# Patient Record
Sex: FEMALE | Race: WHITE
[De-identification: names, ages, dates, MRNs, and addresses within clinical notes are randomized per-mention and may not be internally consistent; named-entity substitution may affect disease eponyms.]

---

## 2021-05-18 ENCOUNTER — HOSPITAL ENCOUNTER (OUTPATIENT)
Dept: HOSPITAL 75 - RAD | Age: 41
End: 2021-05-18
Attending: INTERNAL MEDICINE
Payer: MEDICAID

## 2021-05-18 DIAGNOSIS — Z12.31: Primary | ICD-10-CM

## 2021-05-18 PROCEDURE — 77067 SCR MAMMO BI INCL CAD: CPT

## 2021-05-18 PROCEDURE — 77063 BREAST TOMOSYNTHESIS BI: CPT

## 2021-05-18 NOTE — DIAGNOSTIC IMAGING REPORT
INDICATION: Routine screening.



COMPARISON: No prior mammograms are available for comparison.

This is a baseline study.



2-D and 3-D bilateral screening mammography was performed with

CAD.



Scattered fibroglandular densities are identified bilaterally. No

mass or malignant appearing microcalcifications are seen. Axillae

are unremarkable.



IMPRESSION: BI-RADS Category 1. 



No mammographic features suspicious for malignancy are

identified.



Dictated by: 



  Dictated on workstation # [id number]

## 2021-07-04 ENCOUNTER — HOSPITAL ENCOUNTER (EMERGENCY)
Dept: HOSPITAL 75 - ER | Age: 41
LOS: 1 days | Discharge: HOME | End: 2021-07-05
Payer: MEDICAID

## 2021-07-04 VITALS — WEIGHT: 214.07 LBS | HEIGHT: 61.97 IN | BODY MASS INDEX: 39.39 KG/M2

## 2021-07-04 DIAGNOSIS — N39.0: Primary | ICD-10-CM

## 2021-07-04 LAB
ALBUMIN SERPL-MCNC: 4.2 GM/DL (ref 3.2–4.5)
ALP SERPL-CCNC: 71 U/L (ref 40–136)
ALT SERPL-CCNC: 20 U/L (ref 0–55)
APTT PPP: YELLOW S
BASOPHILS # BLD AUTO: 0 10^3/UL (ref 0–0.1)
BASOPHILS NFR BLD AUTO: 0 % (ref 0–10)
BILIRUB SERPL-MCNC: 0.3 MG/DL (ref 0.1–1)
BILIRUB UR QL STRIP: NEGATIVE
BUN/CREAT SERPL: 12
CALCIUM SERPL-MCNC: 8.8 MG/DL (ref 8.5–10.1)
CHLORIDE SERPL-SCNC: 103 MMOL/L (ref 98–107)
CO2 SERPL-SCNC: 23 MMOL/L (ref 21–32)
CREAT SERPL-MCNC: 0.85 MG/DL (ref 0.6–1.3)
EOSINOPHIL # BLD AUTO: 0 10^3/UL (ref 0–0.3)
EOSINOPHIL NFR BLD AUTO: 1 % (ref 0–10)
FIBRINOGEN PPP-MCNC: CLEAR MG/DL
GFR SERPLBLD BASED ON 1.73 SQ M-ARVRAT: > 60 ML/MIN
GLUCOSE SERPL-MCNC: 188 MG/DL (ref 70–105)
GLUCOSE UR STRIP-MCNC: (no result) MG/DL
HCT VFR BLD CALC: 43 % (ref 35–52)
HGB BLD-MCNC: 14.2 G/DL (ref 11.5–16)
KETONES UR QL STRIP: NEGATIVE
LEUKOCYTE ESTERASE UR QL STRIP: NEGATIVE
LIPASE SERPL-CCNC: 18 U/L (ref 8–78)
LYMPHOCYTES # BLD AUTO: 1.3 10^3/UL (ref 1–4)
LYMPHOCYTES NFR BLD AUTO: 37 % (ref 12–44)
MANUAL DIFFERENTIAL PERFORMED BLD QL: NO
MCH RBC QN AUTO: 30 PG (ref 25–34)
MCHC RBC AUTO-ENTMCNC: 33 G/DL (ref 32–36)
MCV RBC AUTO: 90 FL (ref 80–99)
MONOCYTES # BLD AUTO: 0.5 10^3/UL (ref 0–1)
MONOCYTES NFR BLD AUTO: 14 % (ref 0–12)
NEUTROPHILS # BLD AUTO: 1.6 10^3/UL (ref 1.8–7.8)
NEUTROPHILS NFR BLD AUTO: 48 % (ref 42–75)
NITRITE UR QL STRIP: POSITIVE
PH UR STRIP: 5.5 [PH] (ref 5–9)
PLATELET # BLD: 189 10^3/UL (ref 130–400)
PMV BLD AUTO: 9.6 FL (ref 9–12.2)
POTASSIUM SERPL-SCNC: 3.4 MMOL/L (ref 3.6–5)
PROT SERPL-MCNC: 7.6 GM/DL (ref 6.4–8.2)
PROT UR QL STRIP: NEGATIVE
SODIUM SERPL-SCNC: 138 MMOL/L (ref 135–145)
SP GR UR STRIP: >=1.03 (ref 1.02–1.02)
WBC # BLD AUTO: 3.4 10^3/UL (ref 4.3–11)

## 2021-07-04 PROCEDURE — 83690 ASSAY OF LIPASE: CPT

## 2021-07-04 PROCEDURE — 81000 URINALYSIS NONAUTO W/SCOPE: CPT

## 2021-07-04 PROCEDURE — 87186 SC STD MICRODIL/AGAR DIL: CPT

## 2021-07-04 PROCEDURE — 36415 COLL VENOUS BLD VENIPUNCTURE: CPT

## 2021-07-04 PROCEDURE — 87077 CULTURE AEROBIC IDENTIFY: CPT

## 2021-07-04 PROCEDURE — 87088 URINE BACTERIA CULTURE: CPT

## 2021-07-04 PROCEDURE — 85025 COMPLETE CBC W/AUTO DIFF WBC: CPT

## 2021-07-04 PROCEDURE — 80306 DRUG TEST PRSMV INSTRMNT: CPT

## 2021-07-04 PROCEDURE — 86141 C-REACTIVE PROTEIN HS: CPT

## 2021-07-04 PROCEDURE — 80053 COMPREHEN METABOLIC PANEL: CPT

## 2021-07-04 NOTE — ED ABDOMINAL PAIN
General


Chief Complaint:  Abdominal/GI Problems


Stated Complaint:  LEFT SIDE ABD PAIN


Source of Information:  Patient


Exam Limitations:  No Limitations





History of Present Illness


Date Seen by Provider:  Jul 4, 2021


Time Seen by Provider:  23:08


Initial Comments


Patient presents ER by private conveyance with chief complaint that for the past

4 days she has had some progressively worsening left lower abdominal pain 

radiating through to her back flank and left shoulder blade.  Not worse with 

eating.  Better with laying on her left side.  No recent trauma.  No rash fever 

chills nausea vomiting.  She did have some diarrhea although she associated that

with her Metformin and taking laxatives.  She has a history of gastroparesis 

which she associates with her history of methamphetamine use.  She used to take 

Reglan for it but has not had any available to her for the past year.





Allergies and Home Medications


Allergies


Coded Allergies:  


     Penicillins (Verified  Allergy, Unknown, rash, 7/4/21)


     Sulfa (Sulfonamide Antibiotics) (Verified  Allergy, Unknown, stops 

breathing, 7/4/21)


     nitrofurantoin (Verified  Allergy, Unknown, 7/4/21)





Home Medications


Cefdinir 300 Mg Capsule, 300 MG PO BID


   Prescribed by: ANNE-MARIE CHAVIRA on 7/5/21 0112





Patient Home Medication List


Home Medication List Reviewed:  Yes





Review of Systems


Review of Systems


Constitutional:  No chills, No diaphoresis


EENTM:  No Blurred Vision, No Double Vision


Respiratory:  Denies Cough, Denies Orthopnea


Cardiovascular:  Denies Chest Pain, Denies Edema


Gastrointestinal:  Abdominal Pain; Denies Constipated, Denies Diarrhea


Genitourinary:  Denies Burning, Denies Drainage


Musculoskeletal:  No back pain, No gout


Skin:  No change in color, No dryness


Psychiatric/Neurological:  Denies Depressed, Denies Headache





All Other Systems Reviewed


Negative Unless Noted:  Yes





Past Medical-Social-Family Hx


Patient Social History


Tobacco Use?:  No


Use of E-Cig and/or Vaping dev:  No


Substance use?:  Yes


Substance type:  Methamphetamine





Physical Exam


Vital Signs





Vital Signs - First Documented








 7/4/21 7/5/21





 23:00 01:37


 


Temp 37.0 


 


Pulse 91 


 


Resp 18 


 


B/P (MAP) 148/99 (115) 


 


Pulse Ox 97 


 


O2 Delivery  Room Air





Capillary Refill :


Height/Weight/BMI


Height: '"


Weight: lbs. oz. kg;  BMI


Method:


General Appearance:  WD/WN, mild distress


HEENT:  PERRL/EOMI, pharynx normal


Neck:  full range of motion, normal inspection


Respiratory:  lungs clear, normal breath sounds, no respiratory distress, no 

accessory muscle use


Cardiovascular:  normal peripheral pulses, regular rate, rhythm


Gastrointestinal:  non tender, soft


Extremities:  normal range of motion, normal capillary refill


Neurologic/Psychiatric:  alert, normal mood/affect, oriented x 3


Skin:  normal color, warm/dry





Progress/Results/Core Measures


Results/Orders


Lab Results





Laboratory Tests








Test


 7/4/21


23:10 7/4/21


23:45 Range/Units


 


 


White Blood Count


 3.4 L


 


 4.3-11.0


10^3/uL


 


Red Blood Count


 4.77 


 


 3.80-5.11


10^6/uL


 


Hemoglobin 14.2   11.5-16.0  g/dL


 


Hematocrit 43   35-52  %


 


Mean Corpuscular Volume 90   80-99  fL


 


Mean Corpuscular Hemoglobin 30   25-34  pg


 


Mean Corpuscular Hemoglobin


Concent 33 


 


 32-36  g/dL





 


Red Cell Distribution Width 13.0   10.0-14.5  %


 


Platelet Count


 189 


 


 130-400


10^3/uL


 


Mean Platelet Volume 9.6   9.0-12.2  fL


 


Immature Granulocyte % (Auto) 0    %


 


Neutrophils (%) (Auto) 48   42-75  %


 


Lymphocytes (%) (Auto) 37   12-44  %


 


Monocytes (%) (Auto) 14 H  0-12  %


 


Eosinophils (%) (Auto) 1   0-10  %


 


Basophils (%) (Auto) 0   0-10  %


 


Neutrophils # (Auto)


 1.6 L


 


 1.8-7.8


10^3/uL


 


Lymphocytes # (Auto)


 1.3 


 


 1.0-4.0


10^3/uL


 


Monocytes # (Auto)


 0.5 


 


 0.0-1.0


10^3/uL


 


Eosinophils # (Auto)


 0.0 


 


 0.0-0.3


10^3/uL


 


Basophils # (Auto)


 0.0 


 


 0.0-0.1


10^3/uL


 


Immature Granulocyte # (Auto)


 0.0 


 


 0.0-0.1


10^3/uL


 


Sodium Level 138   135-145  MMOL/L


 


Potassium Level 3.4 L  3.6-5.0  MMOL/L


 


Chloride Level 103     MMOL/L


 


Carbon Dioxide Level 23   21-32  MMOL/L


 


Anion Gap 12   5-14  MMOL/L


 


Blood Urea Nitrogen 10   7-18  MG/DL


 


Creatinine


 0.85 


 


 0.60-1.30


MG/DL


 


Estimat Glomerular Filtration


Rate > 60 


 


  





 


BUN/Creatinine Ratio 12    


 


Glucose Level 188 H    MG/DL


 


Calcium Level 8.8   8.5-10.1  MG/DL


 


Corrected Calcium 8.6   8.5-10.1  MG/DL


 


Total Bilirubin 0.3   0.1-1.0  MG/DL


 


Aspartate Amino Transf


(AST/SGOT) 21 


 


 5-34  U/L





 


Alanine Aminotransferase


(ALT/SGPT) 20 


 


 0-55  U/L





 


Alkaline Phosphatase 71     U/L


 


C-Reactive Protein High


Sensitivity 0.41 


 


 0.00-0.50


MG/DL


 


Total Protein 7.6   6.4-8.2  GM/DL


 


Albumin 4.2   3.2-4.5  GM/DL


 


Lipase 18   8-78  U/L


 


Urine Color  YELLOW   


 


Urine Clarity  CLEAR   


 


Urine pH  5.5  5-9  


 


Urine Specific Gravity  >=1.030  1.016-1.022  


 


Urine Protein  NEGATIVE  NEGATIVE  


 


Urine Glucose (UA)  TRACE H NEGATIVE  


 


Urine Ketones  NEGATIVE  NEGATIVE  


 


Urine Nitrite  POSITIVE H NEGATIVE  


 


Urine Bilirubin  NEGATIVE  NEGATIVE  


 


Urine Urobilinogen  0.2  < = 1.0  MG/DL


 


Urine Leukocyte Esterase  NEGATIVE  NEGATIVE  


 


Urine RBC (Auto)  NEGATIVE  NEGATIVE  


 


Urine RBC  0-2   /HPF


 


Urine WBC  2-5   /HPF


 


Urine Squamous Epithelial


Cells 


 2-5 


  /HPF





 


Urine Crystals  NONE   /LPF


 


Urine Bacteria  LARGE H  /HPF


 


Urine Casts  NONE   /LPF


 


Urine Mucus  SMALL H  /LPF


 


Urine Culture Indicated  YES   


 


Urine Opiates Screen  NEGATIVE  NEGATIVE  


 


Urine Oxycodone Screen  NEGATIVE  NEGATIVE  


 


Urine Methadone Screen  NEGATIVE  NEGATIVE  


 


Urine Propoxyphene Screen  NEGATIVE  NEGATIVE  


 


Urine Barbiturates Screen  NEGATIVE  NEGATIVE  


 


Ur Tricyclic Antidepressants


Screen 


 NEGATIVE 


 NEGATIVE  





 


Urine Phencyclidine Screen  NEGATIVE  NEGATIVE  


 


Urine Amphetamines Screen  NEGATIVE  NEGATIVE  


 


Urine Methamphetamines Screen  POSITIVE H NEGATIVE  


 


Urine Benzodiazepines Screen  NEGATIVE  NEGATIVE  


 


Urine Cocaine Screen  NEGATIVE  NEGATIVE  


 


Urine Cannabinoids Screen  NEGATIVE  NEGATIVE  








My Orders





Orders - ANNE-MARIE CHAVIRA


Cbc With Automated Diff (7/4/21 23:20)


Comprehensive Metabolic Panel (7/4/21 23:20)


Hs C Reactive Protein (7/4/21 23:20)


Lipase (7/4/21 23:20)


Ua Culture If Indicated (7/4/21 23:20)


Drug Screen Stat (Urine) (7/4/21 23:20)


Ed Iv/Invasive Line Start (7/4/21 23:20)


Diphenhydramine Injection (Benadryl Inje (7/4/21 23:30)


Metoclopramide Injection (Reglan Injecti (7/4/21 23:30)


Urine Culture (7/4/21 23:45)


Ceftriaxone (Rocephin) (7/5/21 01:00)





Medications Given in ED





Current Medications








 Medications  Dose


 Ordered  Sig/Darell


 Route  Start Time


 Stop Time Status Last Admin


Dose Admin


 


 Ceftriaxone


 Sodium 1000 mg/


 Sterile Water  10 ml @ 


 200 mls/hr  ONCE  ONCE


 IV  7/5/21 01:00


 7/5/21 01:02 DC 7/5/21 01:28


200 MLS/HR


 


 Diphenhydramine


 HCl  25 mg  ONCE  ONCE


 IVP  7/4/21 23:30


 7/4/21 23:31 DC 7/4/21 23:59


25 MG


 


 Metoclopramide HCl  10 mg  ONCE  ONCE


 IVP  7/4/21 23:30


 7/4/21 23:31 DC 7/4/21 23:59


10 MG








Vital Signs/I&O











 7/4/21 7/5/21





 23:00 01:37


 


Temp 37.0 


 


Pulse 91 81


 


Resp 18 18


 


B/P (MAP) 148/99 (115) 118/87 (115)


 


Pulse Ox 97 100


 


O2 Delivery  Room Air











Progress


Progress Note :  


   Time:  01:12


Progress Note


The patient does feel better after the Reglan but she does not want us to send a

prescription for any.  She was sleeping when we reassessed her.  Plan to treat 

her outpatient on third generation cephalosporins for her UTI.  Rocephin IV x1.





Departure


Impression





   Primary Impression:  


   Urinary tract infection


   Qualified Codes:  N30.00 - Acute cystitis without hematuria


Disposition:  01 HOME, SELF-CARE


Condition:  Stable





Departure-Patient Inst.


Decision time for Depature:  01:09


Patient Instructions:  Kidney Infection (DC)





Add. Discharge Instructions:  


Drink lots of fluids.


Cefdinir 1 capsule twice a day for the next 10 days.


Return to the ER if you are having worsening symptoms otherwise follow-up with 

your primary care doctor in a week.


Expect improvement over the next 3 to 4 days.


All discharge instructions reviewed with patient and/or family. Voiced 

understanding.


Scripts


Cefdinir (Cefdinir) 300 Mg Capsule


300 MG PO BID for 10 Days, #20 CAP 0 Refills


   Prov: FAN,ANNE-MARIE J         7/5/21


Work/School Note:  Work Release Form   Date Seen in the Emergency Department:  [facility]

ul 5, 2021


   Return to Work:  Jul 7, 2021


   Restrictions:  No Restrictions











ANNE-MARIE CHAVIRA                  Jul 4, 2021 23:27

## 2021-07-05 VITALS — DIASTOLIC BLOOD PRESSURE: 87 MMHG | SYSTOLIC BLOOD PRESSURE: 118 MMHG

## 2021-07-05 LAB
BACTERIA #/AREA URNS HPF: (no result) /HPF
BARBITURATES UR QL: NEGATIVE
BENZODIAZ UR QL SCN: NEGATIVE
COCAINE UR QL: NEGATIVE
METHADONE UR QL SCN: NEGATIVE
METHAMPHETAMINE SCREEN URINE S: POSITIVE
OPIATES UR QL SCN: NEGATIVE
OXYCODONE UR QL: NEGATIVE
PROPOXYPH UR QL: NEGATIVE
RBC #/AREA URNS HPF: (no result) /HPF
SQUAMOUS #/AREA URNS HPF: (no result) /HPF
TRICYCLICS UR QL SCN: NEGATIVE
WBC #/AREA URNS HPF: (no result) /HPF

## 2021-07-10 ENCOUNTER — HOSPITAL ENCOUNTER (EMERGENCY)
Dept: HOSPITAL 75 - ER FS | Age: 41
Discharge: HOME | End: 2021-07-10
Payer: MEDICAID

## 2021-07-10 VITALS — SYSTOLIC BLOOD PRESSURE: 110 MMHG | DIASTOLIC BLOOD PRESSURE: 74 MMHG

## 2021-07-10 VITALS — HEIGHT: 62.01 IN | WEIGHT: 209.88 LBS | BODY MASS INDEX: 38.13 KG/M2

## 2021-07-10 DIAGNOSIS — R11.10: Primary | ICD-10-CM

## 2021-07-10 LAB
ALBUMIN SERPL-MCNC: 3.6 GM/DL (ref 3.2–4.5)
ALP SERPL-CCNC: 62 U/L (ref 40–136)
ALT SERPL-CCNC: 14 U/L (ref 0–55)
APTT PPP: YELLOW S
BACTERIA #/AREA URNS HPF: (no result) /HPF
BASOPHILS # BLD AUTO: 0 10^3/UL (ref 0–0.1)
BASOPHILS NFR BLD AUTO: 0 % (ref 0–10)
BILIRUB SERPL-MCNC: 0.2 MG/DL (ref 0.1–1)
BILIRUB UR QL STRIP: NEGATIVE
BUN/CREAT SERPL: 12
CALCIUM SERPL-MCNC: 8.5 MG/DL (ref 8.5–10.1)
CHLORIDE SERPL-SCNC: 97 MMOL/L (ref 98–107)
CO2 SERPL-SCNC: 23 MMOL/L (ref 21–32)
CREAT SERPL-MCNC: 0.74 MG/DL (ref 0.6–1.3)
EOSINOPHIL # BLD AUTO: 0 10^3/UL (ref 0–0.3)
EOSINOPHIL NFR BLD AUTO: 0 % (ref 0–10)
FIBRINOGEN PPP-MCNC: CLEAR MG/DL
GFR SERPLBLD BASED ON 1.73 SQ M-ARVRAT: > 60 ML/MIN
GLUCOSE SERPL-MCNC: 182 MG/DL (ref 70–105)
GLUCOSE UR STRIP-MCNC: NEGATIVE MG/DL
HCT VFR BLD CALC: 43 % (ref 35–52)
HGB BLD-MCNC: 14.3 G/DL (ref 11.5–16)
KETONES UR QL STRIP: NEGATIVE
LEUKOCYTE ESTERASE UR QL STRIP: (no result)
LIPASE SERPL-CCNC: 37 U/L (ref 8–78)
LYMPHOCYTES # BLD AUTO: 1.9 X 10^3 (ref 1–4)
LYMPHOCYTES NFR BLD AUTO: 43 % (ref 12–44)
MANUAL DIFFERENTIAL PERFORMED BLD QL: NO
MCH RBC QN AUTO: 29 PG (ref 25–34)
MCHC RBC AUTO-ENTMCNC: 34 G/DL (ref 32–36)
MCV RBC AUTO: 87 FL (ref 80–99)
MONOCYTES # BLD AUTO: 0.3 X 10^3 (ref 0–1)
MONOCYTES NFR BLD AUTO: 6 % (ref 0–12)
NEUTROPHILS # BLD AUTO: 2.2 X 10^3 (ref 1.8–7.8)
NEUTROPHILS NFR BLD AUTO: 51 % (ref 42–75)
NITRITE UR QL STRIP: NEGATIVE
PH UR STRIP: 6 [PH] (ref 5–9)
PLATELET # BLD: 145 10^3/UL (ref 130–400)
PMV BLD AUTO: 10.4 FL (ref 7.4–10.4)
POTASSIUM SERPL-SCNC: 3.8 MMOL/L (ref 3.6–5)
PROT SERPL-MCNC: 7 GM/DL (ref 6.4–8.2)
PROT UR QL STRIP: NEGATIVE
RBC #/AREA URNS HPF: (no result) /HPF
SODIUM SERPL-SCNC: 133 MMOL/L (ref 135–145)
SP GR UR STRIP: 1.01 (ref 1.02–1.02)
WBC # BLD AUTO: 4.4 10^3/UL (ref 4.3–11)

## 2021-07-10 PROCEDURE — 85025 COMPLETE CBC W/AUTO DIFF WBC: CPT

## 2021-07-10 PROCEDURE — 80053 COMPREHEN METABOLIC PANEL: CPT

## 2021-07-10 PROCEDURE — 83690 ASSAY OF LIPASE: CPT

## 2021-07-10 PROCEDURE — 87088 URINE BACTERIA CULTURE: CPT

## 2021-07-10 PROCEDURE — 74019 RADEX ABDOMEN 2 VIEWS: CPT

## 2021-07-10 PROCEDURE — 81000 URINALYSIS NONAUTO W/SCOPE: CPT

## 2021-07-10 PROCEDURE — 36415 COLL VENOUS BLD VENIPUNCTURE: CPT

## 2021-07-10 NOTE — ED GI
General


Chief Complaint:  Abdominal/GI Problems


Stated Complaint:  VOMITING





History of Present Illness


Date Seen by Provider:  Jul 10, 2021


Time Seen by Provider:  16:27


Initial Comments


41-year-old female presents with vomiting.  Patient reports she is having hard 

time keeping them down.  She denies any abdominal pain.  Patient reports that 

she was recently treated about a week ago for a urinary tract infection with 

cefdinir.  Patient denies diarrhea.  Reports that the vomiting started 

yesterday.





Allergies and Home Medications


Allergies


Coded Allergies:  


     Penicillins (Verified  Allergy, Unknown, rash, 7/4/21)


     Sulfa (Sulfonamide Antibiotics) (Verified  Allergy, Unknown, stops 

breathing, 7/4/21)


     nitrofurantoin (Verified  Allergy, Unknown, 7/4/21)





Home Medications


Cefdinir 300 Mg Capsule, 300 MG PO BID


   Prescribed by: ANNE-MARIE CHAVIRA on 7/5/21 0112





Patient Home Medication List


Home Medication List Reviewed:  Yes





Review of Systems


Review of Systems


Constitutional:  No chills, No fever


Gastrointestinal:  Denies Abdominal Pain, Denies Diarrhea; Nausea, Vomiting


Genitourinary:  See HPI


Musculoskeletal:  no symptoms reported


Skin:  no symptoms reported


Psychiatric/Neurological:  No Symptoms Reported


Endocrine:  No Symptoms Reported


Hematologic/Lymphatic:  No Symptoms Reported





Physical Exam


Vital Signs





Vital Signs - First Documented








 7/10/21





 16:36


 


Temp 36.6


 


Pulse 76


 


Resp 20


 


B/P (MAP) 110/74 (86)


 


Pulse Ox 95


 


O2 Delivery Room Air





Capillary Refill :


Height/Weight/BMI


Height: '"


Weight: lbs. oz. kg; 39.00 BMI


Method:


General Appearance:  WD/WN, no apparent distress


Neck:  supple


Respiratory:  lungs clear, normal breath sounds


Cardiovascular:  normal peripheral pulses, regular rate, rhythm


Gastrointestinal:  non tender, soft


Extremities:  normal range of motion


Back:  no CVA tenderness


Neurologic/Psychiatric:  no motor/sensory deficits, alert, normal mood/affect, 

oriented x 3





Progress/Results/Core Measures


Results/Orders


Lab Results





Laboratory Tests








Test


 7/10/21


16:30 7/10/21


16:36 Range/Units


 


 


Urine Color YELLOW    


 


Urine Clarity CLEAR    


 


Urine pH 6.0   5-9  


 


Urine Specific Gravity 1.015 L  1.016-1.022  


 


Urine Protein NEGATIVE   NEGATIVE  


 


Urine Glucose (UA) NEGATIVE   NEGATIVE  


 


Urine Ketones NEGATIVE   NEGATIVE  


 


Urine Nitrite NEGATIVE   NEGATIVE  


 


Urine Bilirubin NEGATIVE   NEGATIVE  


 


Urine Urobilinogen 0.2   < = 1.0  MG/DL


 


Urine Leukocyte Esterase 1+ H  NEGATIVE  


 


Urine RBC (Auto) NEGATIVE   NEGATIVE  


 


Urine RBC 0-2    /HPF


 


Urine WBC 10-25 H   /HPF


 


Urine Squamous Epithelial


Cells 10-25 H


 


  /HPF





 


Urine Crystals NONE    /LPF


 


Urine Bacteria FEW H   /HPF


 


Urine Casts NONE    /LPF


 


Urine Mucus SMALL H   /LPF


 


Urine Culture Indicated YES    


 


White Blood Count


 


 4.4 


 4.3-11.0


10^3/uL


 


Red Blood Count


 


 4.87 


 4.35-5.85


10^6/uL


 


Hemoglobin  14.3  11.5-16.0  G/DL


 


Hematocrit  43  35-52  %


 


Mean Corpuscular Volume  87  80-99  FL


 


Mean Corpuscular Hemoglobin  29  25-34  PG


 


Mean Corpuscular Hemoglobin


Concent 


 34 


 32-36  G/DL





 


Red Cell Distribution Width  13.2  10.0-14.5  %


 


Platelet Count


 


 145 


 130-400


10^3/uL


 


Mean Platelet Volume  10.4  7.4-10.4  FL


 


Immature Granulocyte % (Auto)  0   %


 


Neutrophils (%) (Auto)  51  42-75  %


 


Lymphocytes (%) (Auto)  43  12-44  %


 


Monocytes (%) (Auto)  6  0-12  %


 


Eosinophils (%) (Auto)  0  0-10  %


 


Basophils (%) (Auto)  0  0-10  %


 


Neutrophils # (Auto)  2.2  1.8-7.8  X 10^3


 


Lymphocytes # (Auto)  1.9  1.0-4.0  X 10^3


 


Monocytes # (Auto)  0.3  0.0-1.0  X 10^3


 


Eosinophils # (Auto)


 


 0.0 


 0.0-0.3


10^3/uL


 


Basophils # (Auto)


 


 0.0 


 0.0-0.1


10^3/uL


 


Immature Granulocyte # (Auto)


 


 0.0 


 0.0-0.1


10^3/uL


 


Sodium Level  133 L 135-145  MMOL/L


 


Potassium Level  3.8  3.6-5.0  MMOL/L


 


Chloride Level  97 L   MMOL/L


 


Carbon Dioxide Level  23  21-32  MMOL/L


 


Anion Gap  13  5-14  MMOL/L


 


Blood Urea Nitrogen  9  7-18  MG/DL


 


Creatinine


 


 0.74 


 0.60-1.30


MG/DL


 


Estimat Glomerular Filtration


Rate 


 > 60 


  





 


BUN/Creatinine Ratio  12   


 


Glucose Level  182 H   MG/DL


 


Calcium Level  8.5  8.5-10.1  MG/DL


 


Corrected Calcium  8.8  8.5-10.1  MG/DL


 


Total Bilirubin  0.2  0.1-1.0  MG/DL


 


Aspartate Amino Transf


(AST/SGOT) 


 23 


 5-34  U/L





 


Alanine Aminotransferase


(ALT/SGPT) 


 14 


 0-55  U/L





 


Alkaline Phosphatase  62    U/L


 


Total Protein  7.0  6.4-8.2  GM/DL


 


Albumin  3.6  3.2-4.5  GM/DL


 


Lipase  37  8-78  U/L








My Orders





Orders - BAIG,BELKYS L DO


Cbc With Automated Diff (7/10/21 16:33)


Comprehensive Metabolic Panel (7/10/21 16:33)


Lipase (7/10/21 16:33)


Ua Culture If Indicated (7/10/21 16:33)


Abdomen Flat & Upright/Decub (7/10/21 16:33)


Ondansetron Injection (Zofran Injectio (7/10/21 16:45)


Lactated Ringers (Lr 1000 Ml Iv Solution (7/10/21 16:33)


Urine Culture (7/10/21 16:30)





Medications Given in ED





Current Medications








 Medications  Dose


 Ordered  Sig/Darell


 Route  Start Time


 Stop Time Status Last Admin


Dose Admin


 


 Ondansetron HCl  4 mg  ONCE  ONCE


 IVP  7/10/21 16:45


 7/10/21 16:46 DC 7/10/21 16:49


4 MG








Vital Signs/I&O











 7/10/21





 16:36


 


Temp 36.6


 


Pulse 76


 


Resp 20


 


B/P (MAP) 110/74 (86)


 


Pulse Ox 95


 


O2 Delivery Room Air











Progress


Progress Note :  


Progress Note


Patient with no acute findings in the ER.  Patient's urine is likely contaminant

as she has a significant amount of squamous cells.  Patient currently on 

cefdinir which show susceptibility based on review of her urine culture from a 

few days ago.  Patient with no signs of acute distress, vomiting while here in 

the ER.  I will prescribe her some Zofran for her vomiting.  She is stable and 

discharged home.





Departure


Impression





   Primary Impression:  


   Vomiting


   Qualified Codes:  R11.10 - Vomiting, unspecified


Disposition:  01 HOME, SELF-CARE


Condition:  Stable





Departure-Patient Inst.


Referrals:  


NO,LOCAL PHYSICIAN (PCP/Family)


Primary Care Physician


Patient Instructions:  Nausea and Vomiting, Adult





Add. Discharge Instructions:  


Clear liquid diet, advance as tolerated


Follow-up with your primary care provider in 3 to 4 days for recheck of today's 

symptoms





All discharge instructions reviewed with patient and/or family. Voiced 

understanding.


Scripts


Ondansetron (Ondansetron Odt) 4 Mg Tab.rapdis


4 MG PO Q6H PRN for NAUSEA/VOMITING, #20 TAB 0 Refills


   Prov: BELKYS BAIG DO         7/10/21











BELKYS BAIG DO               Jul 10, 2021 16:27

## 2021-07-10 NOTE — DIAGNOSTIC IMAGING REPORT
Clinical indication: Patient with nausea, vomiting and

generalized malaise x 1 week.



Exam: X-ray of the abdomen with multiple supine and upright

views.



Comparison: None.



Findings: There are multiple air-fluid levels seen along the

expected region of the colon. It is possible there is some

involvement of the small bowel. There is a loop of air-filled

intestine overlying the mid to lower abdomen which measures up to

4.1 cm in width. It is difficult to determine if this is small

bowel or colon. Otherwise, there are no dilated loops seen. There

is no gross evidence of bowel wall thickening. There is no

intra-abdominal free air.



There are no focal calcifications overlying the expected

regions/pathways of both kidneys, ureters and bladder regions.



The visualized bones and extra-abdominal soft tissues are

unremarkable. There appears to be mild bibasilar atelectasis.



Impression:

1: There is a loop of air distended intestine overlying the

midline lower abdomen. It is difficult to determine if this is

small bowel or colon. There are no other areas of dilated

intestine. There are multiple air-fluid levels seen overlying the

expected region of the colon. There may be areas involving the

small bowel as well. This may represent transient air distention

versus ileus versus intestinal obstruction. Gastroenteritis also

cannot be completely excluded. CT scan would better evaluate.



2: Suspected mild bibasilar atelectasis. If there are pulmonary

symptoms suggesting infection, then chest x-ray PA and lateral

views would better evaluate. 



Dictated by: 



  Dictated on workstation # [id number]

## 2021-10-09 ENCOUNTER — HOSPITAL ENCOUNTER (EMERGENCY)
Dept: HOSPITAL 75 - ER FS | Age: 41
Discharge: HOME | End: 2021-10-09
Payer: MEDICAID

## 2021-10-09 VITALS — HEIGHT: 61.81 IN | WEIGHT: 218.7 LBS | BODY MASS INDEX: 40.25 KG/M2

## 2021-10-09 VITALS — SYSTOLIC BLOOD PRESSURE: 130 MMHG | DIASTOLIC BLOOD PRESSURE: 95 MMHG

## 2021-10-09 DIAGNOSIS — S60.221A: Primary | ICD-10-CM
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
## 2021-10-09 PROCEDURE — 73130 X-RAY EXAM OF HAND: CPT

## 2021-10-09 PROCEDURE — 29125 APPL SHORT ARM SPLINT STATIC: CPT

## 2021-10-09 NOTE — XMS REPORT
Clinical Summary

                             Created on: 10/09/2021



Syeda Richardson

External Reference #: [id number]

: 1980

Sex: Female



Demographics





                          Address                   [address] [place] [address]  [address]

 

                          Home Phone                [phone]

 

                          Preferred Language        English

 

                          Marital Status            Single

 

                          Hoahaoism Affiliation     Unknown

 

                          Race                      White

 

                          Ethnic Group              Not  or 





Author





                          Author                    [facility]

 

                          Organization              [facility]

 

                          Address                   Unknown

 

                          Phone                     Unavailable







Support





                Name            Relationship    Address         Phone

 

                Nory Dasilva   [employer]            Unknown         [phone]







Care Team Providers





                    Care Team Member Name Role                Phone

 

                    Vasyl Gutierrez MD Unavailable         Unavailable







Source Comments

Some departments are not documenting in the electronic medical record.  If you d
o not see the information that you expected, contact Release of Information in [facility] Health Information Management department at [phone] for further assistan
ce in locating additional records.[facility]



Allergies





                                        Comments



                 Active Allergy  Reactions       Severity        Noted Date 

 

                                         



                     Nitrofurantoin      RASH                2014 



                                         Monohyd/M-Cryst    

 

                                         



                     Penicillins         RASH                2014 

 

                                        



Stop breathing



                     Sulfa (Sulfonamide  SEE COMMENTS        2014 



                                         Antibiotics)    







Medications





                          End Date                  Status



              Medication   Sig          Dispensed    Refills      Start  



                                         Date  

 

                                                    Active



                     oxyCODONE-acetaminophen  Take 1-2 Tabs       0   



                           (PERCOCET) 5-325 mg       by mouth     



                           tablet                    every 4 hours     



                                         as needed Max     



                                         12 tabs/day     

 

                                                    Active



                     metformin-ER(+)     Take 1,000 mg       0   



                           (FORTAMET) 1,000 mg       by mouth     



                           tablet                    daily with     



                                         dinner.     

 

                                                    Active



                     omeprazole (PRILOSEC) 10  Take 10 mg by       0   



                           mg capsule                mouth daily.     

 

                                                    Active



                     metoclopramide HCl  Take 10 mg by       0   



                           (REGLAN) 10 mg tablet     mouth before     



                                         meals and at     



                                         bedtime.     







Active Problems





 



                           Problem                   Noted Date

 

 



                           Orbit fracture, right     2014

 

 



                                         Overview:



                                         Formatting of this note might be differ

ent from the original.



                                         Floor







Social History





                                        Date



                 Tobacco Use     Types           Packs/Day       Years Used 

 

                                         



                                         Never Smoker    

 

    



                                         Smokeless Tobacco: Never   



                                         Used   







                                        Tobacco Cessation: Counseling Given: Yes









                                        Comments



                           Alcohol Use               Standard Drinks/Week 

 

                                         



                           Not Asked                 0 (1 standard drink = 0.6 o

z pure alcohol) 







 



                           Sex Assigned at Birth     Date Recorded

 

 



                                         Not on file 







Last Filed Vital Signs





                    Reading             Time Taken          Comments



                                         Vital Sign   

 

                    120/80              09/15/2014 11:51 AM CDT  



                                         Blood Pressure   

 

                    66                  09/15/2014 11:51 AM CDT  



                                         Pulse   

 

                    36.7 C (98 F)   09/15/2014 11:51 AM CDT  



                                         Temperature   

 

                    -                   -                    



                                         Respiratory Rate   

 

                    -                   -                    



                                         Oxygen Saturation   

 

                    -                   -                    



                                         Inhaled Oxygen   



                                         Concentration   

 

                    112.9 kg (249 lb)   09/15/2014 11:51 AM CDT  



                                         Weight   

 

                    157.5 cm (5' 2")    09/15/2014 11:51 AM CDT  



                                         Height   

 

                    45.54               09/15/2014 11:51 AM CDT  



                                         Body Mass Index   







Plan of Treatment





   



                 Health Maintenance  Due Date        Last Done       Comments

 

   



                           HIV SCREENING             1995  

 

   



                           DTAP/TDAP VACCINES (1 -   1998  



                                         Tdap)   

 

   



                           HEPATITIS C SCREENING     1998  

 

   



                           PHYSICAL (COMPREHENSIVE)  1998  



                                         EXAM   

 

   



                           CERVICAL CANCER SCREENING  2001  

 

   



                           BREAST CANCER SCREENING   2020  

 

   



                           INFLUENZA VACCINE         2021  







Results

Not on filefrom Last 3 Months

## 2021-10-09 NOTE — XMS REPORT
Clinical Summary

                             Created on: 10/09/2021



Syeda Richardson

External Reference #: [id number]

: 1980

Sex: Female



Demographics





                          Address                   [address]

[place]  [address]

 

                          Home Phone                [phone]

 

                          Preferred Language        English

 

                          Marital Status            Single

 

                          Sikh Affiliation     Unknown

 

                          Race                      White

 

                          Ethnic Group              Not  or 





Author





                          Author                    [facility]

 

                          Organization              [facility]

 

                          Address                   Unknown

 

                          Phone                     Unavailable







Support





                Name            Relationship    Address         Phone

 

                    Isai Fish          [employer]                [address] 

apt 5

Unknown                                 [phone]







Care Team Providers





                    Care Team Member Name Role                Phone

 

                    Muppalla, Samantha MD PCP                 [phone]

 

                    Kiara Pittman APRN Unavailable         [phone]







Allergies





                                        Comments



                 Active Allergy  Reactions       Severity        Noted Date 

 

                                         



                 Adhesive Tape   Rash            Low             2010 

 

                                         



                           Cephalexin                Nausea And   



                                         Vomiting   

 

                                         



                 Latex           Rash            Low             2018 

 

                                         



                     Metronidazole       Rash                Low  

 

                                         



                     Nitrofurantoin      Rash                Low  

 

                                         



                 Penicillins     Rash            Medium          2018 

 

                                         



                 Sulfa Antibiotics  Anaphylaxis     High            2018 







Medications





                          End Date                  Status



              Medication   Sig          Dispensed    Refills      Start  



                                         Date  

 

                                                    Active



              metoclopramide (REGLAN)  Take 1 tablet  40 tablet    0            

  



                     10 MG tabletIndications:  (10 mg total)       8  



                           Chronic constipation      by mouth 4     



                                         (four) times     



                                         daily.     

 

                                                    Active



                     metFORMIN (GLUCOPHAGE)  Take 500 mg         0   



                           500 MG tablet             by mouth 2     



                                         (two) times     



                                         daily with     



                                         meals.     

 

                                                    Active



                     acetaminophen (TYLENOL)  Take 500 mg         0   



                           500 MG tablet             by mouth     



                                         every 6 (six)     



                                         hours as     



                                         needed for     



                                         Mild Pain.     

 

                                                    Active



                     ibuprofen (ADVIL,MOTRIN)  Take 800 mg         0   



                           800 MG tablet             by mouth     



                                         every 6 (six)     



                                         hours as     



                                         needed for     



                                         Mild Pain.     

 

                                                    Active



              albuterol (PROAIR,  Inhale 2     8 g          1            /2

01  



                     PROVENTIL, VENTOLIN) 108  puffs into          8  



                           (90 Base) MCG/ACT         the lungs     



                           inhalerIndications:       every 6 (six)     



                           Asthma                    hours as     



                                         needed for     



                                         Wheezing.     



                                         Indications:     



                                         Asthma     

 

                                                    Active



              busPIRone (BUSPAR) 10 MG  Take 1 tablet  60 tablet    0           

   



                     tabletIndications:  (10 mg total)       8  



                           Anxiety, Recurrent major  by mouth 2     



                           depressive disorder, in   (two) times     



                           partial remission (HCC)   daily.     

 

                                                    Active



              ARIPiprazole (ABILIFY) 10  Take 1 tablet  60 tablet    0          

    



                     MG tabletIndications:  (10 mg total)       8  



                           Anxiety, Recurrent major  by mouth 2     



                           depressive disorder, in   (two) times     



                           partial remission (HCC)   daily.     

 

                                                    Active



              lubiprostone (AMITIZA) 24  Take 1       30 capsule   0            

  



                     MCG capsuleIndications:  capsule (24         8  



                           Irritable bowel syndrome  mcg total) by     



                           with constipation         mouth daily     



                                         with     



                                         breakfast.     

 

                                                    Active



              polyethylene glycol  Take 17 g by  100 each     6            08/15

/201  



                     (MIRALAX)           mouth 2 (two)       8  



                           packetIndications:        times daily.     



                           Constipation              Indications:     



                                         Constipation     







Active Problems





 



                           Problem                   Noted Date

 

 



                           Morbid obesity with BMI of 45.0-49.9, adult  20

 

 



                           Type 2 diabetes mellitus without complication, withou

t long-term current  

2018



                                         use of insulin 

 

 



                           Drug dependence, continuous abuse  2018

 

 



                           Anxiety                   2018

 

 



                           Recurrent major depressive disorder, in partial remis

shay  2018

 

 



                           Schizophrenia, unspecified type  2018

 

 



                           Irritable bowel syndrome with constipation  

8

 

 



                           Mild intermittent asthma without complication  2018

 

 



                           Uterine leiomyoma         2016

 

 



                           GERD (gastroesophageal reflux disease)  2014

 

 



                           MRSA (methicillin resistant staph aureus) culture pos

itive  10/23/2011

 

 



                           Osteoarthritis of left knee  2011

 

 



                           Hirsutism                 2011

 

 



                           Nonallopathic lesion of rib cage  10/02/2010

 

 



                           Osteopenia                2010







Immunizations





  



                     Name                Administration Dates  Next Due

 

  



                           Tdap                      2010 







Family History





   



                 Medical History  Relation        Name            Comments

 

   



                           No Known Problems         Brother  

 

   



                           Asthma                    Daughter  

 

   



                     Chronic infections  Daughter            EAR

 

   



                     Other               Daughter            Digestive Problems

 

   



                           Asthma                    Daughter  

 

   



                     Chronic infections  Daughter            EAR

 

   



                     Other               Daughter            Digestive Problems

 

   



                           No Known Problems         Father  

 

   



                           Diabetes type II          Maternal  



                                         Grandfather  

 

   



                           Heart disease             Maternal  



                                         Grandmother  

 

   



                           Uterine cancer            Mother  

 

   



                     Other               Sister              PVD







   



                 Relation        Name            Status          Comments

 

   



                           Brother                   Alive 

 

   



                           Daughter                  Alive 

 

   



                           Daughter                  Alive 

 

   



                           Father                    Alive 

 

   



                           Maternal Grandfather      Alive 

 

   



                     Maternal Grandmother               Heart Disease

 

   



                           Mother                    Alive 

 

   



                           Sister                    Alive 







Social History





                                        Date



                 Tobacco Use     Types           Packs/Day       Years Used 

 

                                         



                 Former Smoker   Cigarettes      1.5             5 

 

    



                                         Smokeless Tobacco: Never   



                                         Used   







                                        Tobacco Cessation: Counseling Given: No









                                        Comments



                           Alcohol Use               Standard Drinks/Week 

 

                                         



                           No                        0 (1 standard drink = 0.6 o

z pure alcohol) 







                    Birth Control       Partners            Comments



                                         Sexually Active   

 

                    None                Male                 



                                         Yes   







 



                           Sex Assigned at Birth     Date Recorded

 

 



                                         Not on file 







Last Filed Vital Signs





                    Reading             Time Taken          Comments



                                         Vital Sign   

 

                    100/62              08/15/2018  3:21 PM CDT  



                                         Blood Pressure   

 

                    62                  2018  2:00 PM CDT  



                                         Pulse   

 

                    37.1 C (98.8 F) 2018 12:36 PM CDT  



                                         Temperature   

 

                    20                  2018  2:00 PM CDT  



                                         Respiratory Rate   

 

                    97%                 2018  2:00 PM CDT  



                                         Oxygen Saturation   

 

                    -                   -                    



                                         Inhaled Oxygen   



                                         Concentration   

 

                    112.9 kg (249 lb)   08/15/2018  3:21 PM CDT  



                                         Weight   

 

                    157.5 cm (5' 2")    08/15/2018  3:21 PM CDT  



                                         Height   

 

                    45.54               08/15/2018  3:21 PM CDT  



                                         Body Mass Index   







Plan of Treatment





   



                 Health Maintenance  Due Date        Last Done       Comments

 

   



                           Pneumo-Vaccine: 65+Yrs (1  1986  



                                         of 2 - PPSV23)   

 

   



                           Pneumo-Vaccine: Peds (0-5  1986  



                                         Yrs) & At-Risk Patients   



                                         (6-64 Yrs) (1 of 2 -   



                                         PPSV23)   

 

   



                           Diabetic Eye Exam         1990  

 

   



                           Diabetic Foot Exam        1990  

 

   



                           COVID-19 Vaccine (1)      1992  

 

   



                           Urine Microalbumin        1998  

 

   



                           MMR Vaccines-Adult        1999  

 

   



                     Diabetic A1C Due    2019 

 

   



                     Cervical Cancer Screening  2020 

 

   



                     DTaP,Tdap,and Td Vaccines  2020 



                                         (2 - Td or Tdap)   

 

   



                           Influenza Vaccine (#1)    2021  

 

   



                     Hepatitis C Screening  Completed           2018 

 

   



                     HIB Vaccines        Aged Out            No longer eligible 

based on patient's age to



                                         complete this topic

 

   



                     IPV Vaccines        Aged Out            No longer eligible 

based on patient's age to



                                         complete this topic

 

   



                     Meningococcal Vaccine  Aged Out            No longer eligib

le based on patient's age to



                                         complete this topic

 

   



                     Rotavirus Vaccines  Aged Out            No longer eligible 

based on patient's age to



                                         complete this topic







Results

Not on filefrom Last 3 Months



Advance Directives





For more information, please contact: [phone]







                          Patient Representative    Explanation



                           Type                      Date Recorded  

 

                                                     



                                         Advance Directives   



                                         and Living Will   

 

                                                     



                                         Power of    







Care Teams





                          Start Date                End Date



                     Team Member         Relationship        Specialty  

 

                          18                    



                     Muppalla, Samantha, MD  PCP - General       Family  



                           [phone] (Work)       Medicine  



                                         [phone] (Fax)    



                                         [email or web address]    

 

                          18                    



                           Kiara Pittman APRN   Nurse  



                           [phone] (Work)       Practitioner  



                                         [phone] (Fax)    



                                         [email or web address]

## 2021-10-09 NOTE — DIAGNOSTIC IMAGING REPORT
INDICATION: Hand pain. 



EXAMINATION: Right hand at 4:17 p.m. Three views were obtained.



COMPARISON: There is no prior study available for comparison.



FINDINGS: There is no fracture, dislocation or acute bony

abnormality evident. There does seem to be mild generalized soft

tissue edema about the 4th digit, however. There is no radiopaque

foreign body identified in this area.



IMPRESSION: There is mild generalized soft tissue edema about the

4th digit but there is no evidence for a fracture or for a

radiopaque foreign body. 



Dictated by: 



  Dictated on workstation # [id number]

## 2021-10-09 NOTE — ED UPPER EXTREMITY
General


Chief Complaint:  Upper Extremity


Stated Complaint:  RT HAND INJ





History of Present Illness


Date Seen by Provider:  Oct 9, 2021


Time Seen by Provider:  16:01


Initial Comments


Patient presents with right painful and swollen hand after waking up Friday 

morning. States she has night terrors and she may have hit the wall or her 

boyfriend, does not know for sure, but since then her hand has been swelling and

having pain. She has not applied ice because she states they do not have ice at 

home. No other injury or complaint





Allergies and Home Medications


Allergies


Coded Allergies:  


     Penicillins (Verified  Allergy, Unknown, rash, 7/4/21)


     Sulfa (Sulfonamide Antibiotics) (Verified  Allergy, Unknown, stops b

reathing, 7/4/21)


     nitrofurantoin (Verified  Allergy, Unknown, 7/4/21)





Patient Home Medication List


Home Medication List Reviewed:  Yes


Cefdinir (Cefdinir) 300 Mg Capsule, 300 MG PO BID


   Prescribed by: ANNE-MARIE CHAVIRA on 7/5/21 0112


Ondansetron (Ondansetron Odt) 4 Mg Tab.rapdis, 4 MG PO Q6H PRN for 

NAUSEA/VOMITING


   Prescribed by: BELKYS BAIG on 7/10/21 1714





Review of Systems


Constitutional:  No fever, No malaise


Musculoskeletal:  see HPI, other (R hand pain and swelling)


Skin:  see HPI, change in color (swelling and some redness back of hand)





Past Medical-Social-Family Hx


Patient Social History


Tobacco Use?:  No


Substance use?:  No





Physical Exam


Vital Signs





Vital Signs - First Documented








 10/9/21





 15:55


 


Temp 36.6


 


Pulse 71


 


Resp 18


 


B/P (MAP) 137/97 (110)


 


Pulse Ox 98


 


O2 Delivery Room Air





Capillary Refill :


Height, Weight, BMI


Height: '"


Weight: lbs. oz. kg; 38.00 BMI


Method:


General Appearance:  no apparent distress


Elbow/Forearm:  normal inspection, non-tender, no evidence of injury, normal ROM


Wrist:  Yes normal inspection, Yes non-tender, Yes no evidence of injury, Yes 

normal ROM


Hand:  Right, bone tenderness, ecchymosis, limited ROM, soft tissue tenderness, 

swelling


Neurologic/Tendon:  normal sensation, normal tendon functions


Neurologic/Psychiatric:  no motor/sensory deficits, alert


Skin:  normal color, warm/dry





Progress/Results/Core Measures


Results/Orders


My Orders





Orders - ROVENSTINE,JOHNNY L DO


Hand 3 View Right (10/9/21 16:08)





Vital Signs/I&O











 10/9/21 10/9/21





 15:55 16:35


 


Temp 36.6 36.6


 


Pulse 71 70


 


Resp 18 18


 


B/P (MAP) 137/97 (110) 130/95


 


Pulse Ox 98 98


 


O2 Delivery Room Air Room Air











Progress


Progress Note :  


Progress Note


Review of  films and evidence of proximal 4th MT fx......splinted accordingly 

and Ortho follow up initiated





Diagnostic Imaging





   Diagonstic Imaging:  Xray


   Plain Films/CT/US/NM/MRI:  hand


Comments


Date of Exam:10/09/21





HAND 3 VIEW RIGHT








INDICATION: Hand pain. 





EXAMINATION: Right hand at 4:17 p.m. Three views were obtained.





COMPARISON: There is no prior study available for comparison.





FINDINGS: There is no fracture, dislocation or acute bony


abnormality evident. There does seem to be mild generalized soft


tissue edema about the 4th digit, however. There is no radiopaque


foreign body identified in this area.





IMPRESSION: There is mild generalized soft tissue edema about the


4th digit but there is no evidence for a fracture or for a


radiopaque foreign body. 





Dictated by: 





  Dictated on workstation # [id number]








Dict:   10/09/21 1624


Trans:   10/09/21 1802


[facility] [id number]





Interpreted by:     NANCY ROBERSON MD


Electronically signed by: NANCY ROBERSON MD 10/09/21 1802





Departure


Impression





   Primary Impression:  


   Fx metacarpal


   Qualified Codes:  S62.314A - Displaced fracture of base of fourth metacarpal 

   bone, right hand, initial encounter for closed fracture


   Additional Impression:  


   Contusion of hand


   Qualified Codes:  S60.221A - Contusion of right hand, initial encounter


Disposition:  01 HOME, SELF-CARE


Condition:  Stable





Departure-Patient Inst.


Decision time for Depature:  16:26


Referrals:  


NO,LOCAL PHYSICIAN (PCP)


Primary Care Physician








MARIO HATHAWAY MD


Patient Instructions:  Contusion (DC)





Add. Discharge Instructions:  


Call Dr Hathaway's office to schedule a follow up appointment in 1 week.  Keep the

splint on at all times, you may remove it for bathing only. 





Elevate and ice the hand twice daily to help improve pain and swelling





All discharge instructions reviewed with patient and/or family. Voiced 

understanding.











JOHNNY JOLLY DO          Oct 9, 2021 16:06

## 2021-12-09 ENCOUNTER — HOSPITAL ENCOUNTER (OUTPATIENT)
Dept: HOSPITAL 75 - LAB FS | Age: 41
End: 2021-12-09
Attending: REGISTERED NURSE
Payer: MEDICAID

## 2021-12-09 DIAGNOSIS — Z00.00: Primary | ICD-10-CM

## 2021-12-09 DIAGNOSIS — E11.9: ICD-10-CM

## 2021-12-09 LAB
ALBUMIN SERPL-MCNC: 4.1 GM/DL (ref 3.2–4.5)
ALP SERPL-CCNC: 83 U/L (ref 40–136)
ALT SERPL-CCNC: 17 U/L (ref 0–55)
BILIRUB SERPL-MCNC: 0.3 MG/DL (ref 0.1–1)
BUN/CREAT SERPL: 20
CALCIUM SERPL-MCNC: 9 MG/DL (ref 8.5–10.1)
CHLORIDE SERPL-SCNC: 98 MMOL/L (ref 98–107)
CHOLEST SERPL-MCNC: 192 MG/DL (ref ?–200)
CO2 SERPL-SCNC: 22 MMOL/L (ref 21–32)
CREAT SERPL-MCNC: 0.59 MG/DL (ref 0.6–1.3)
GFR SERPLBLD BASED ON 1.73 SQ M-ARVRAT: 112 ML/MIN
GLUCOSE SERPL-MCNC: 294 MG/DL (ref 70–105)
HDLC SERPL-MCNC: 58 MG/DL (ref 40–60)
POTASSIUM SERPL-SCNC: 3.9 MMOL/L (ref 3.6–5)
PROT SERPL-MCNC: 7.2 GM/DL (ref 6.4–8.2)
SODIUM SERPL-SCNC: 134 MMOL/L (ref 135–145)
TRIGL SERPL-MCNC: 337 MG/DL (ref ?–150)
VLDLC SERPL CALC-MCNC: 67 MG/DL (ref 5–40)

## 2021-12-09 PROCEDURE — 83036 HEMOGLOBIN GLYCOSYLATED A1C: CPT

## 2021-12-09 PROCEDURE — 36415 COLL VENOUS BLD VENIPUNCTURE: CPT

## 2021-12-09 PROCEDURE — 80053 COMPREHEN METABOLIC PANEL: CPT

## 2021-12-09 PROCEDURE — 80061 LIPID PANEL: CPT

## 2022-02-10 ENCOUNTER — HOSPITAL ENCOUNTER (EMERGENCY)
Dept: HOSPITAL 75 - ER FS | Age: 42
Discharge: HOME | End: 2022-02-10
Payer: MEDICAID

## 2022-02-10 VITALS — SYSTOLIC BLOOD PRESSURE: 134 MMHG | DIASTOLIC BLOOD PRESSURE: 89 MMHG

## 2022-02-10 VITALS — BODY MASS INDEX: 45.84 KG/M2 | WEIGHT: 249.12 LBS | HEIGHT: 61.81 IN

## 2022-02-10 DIAGNOSIS — H70.93: Primary | ICD-10-CM

## 2022-02-10 DIAGNOSIS — Z88.2: ICD-10-CM

## 2022-02-10 DIAGNOSIS — Z88.0: ICD-10-CM

## 2022-02-10 DIAGNOSIS — H65.23: ICD-10-CM

## 2022-02-10 PROCEDURE — 99283 EMERGENCY DEPT VISIT LOW MDM: CPT

## 2022-02-10 PROCEDURE — 70486 CT MAXILLOFACIAL W/O DYE: CPT

## 2022-02-10 NOTE — ED GENERAL
General


Stated Complaint:  JAW PAIN/VOMITING


Source of Information:  Patient





History of Present Illness


Date Seen by Provider:  Feb 10, 2022


Time Seen by Provider:  00:28


Initial Comments


41-year-old female presenting with complaints of bilateral ear fullness and 

pressure.  She does have a history of recurrent ear infections and chronic ear 

infections.  She had gone through the clinic approximately 2 weeks ago for 

concern about her ears feeling stuffy and likely might be starting to get an 

infection.  In the past usually when that started happened she was seen and got 

started on antibiotics and treatment right away.  If she did not she could help 

prevent the symptoms from getting worse.  When she was seen this time since it 

was a new provider to her they started her on a steroid nasal spray instead of 

an antibiotic.  She has been using that but has not felt that has made a 

significant improvement.  Tonight she was having the pain going down into her 

neck and jaw.  She denies any fever, chills, vision change, chest pain.  She 

does have a history of methamphetamine abuse and has been clean for about 5 

months.  She does have a history of multidrug resistant organisms.


Associated Systoms:  No Chest Pain, No Cough, No Diaphoresis, No Fever/Chills; 

Headaches (And bilateral ureters and mastoid area behind the ears); No Loss of 

Appetite, No Malaise; Nausea/Vomiting (Nausea and vomiting tonight when her pain

was severe); No Seizure, No Shortness of Air, No Syncope, No Weakness





Allergies and Home Medications


Allergies


Coded Allergies:  


     Penicillins (Verified  Allergy, Unknown, rash, 21)


     Sulfa (Sulfonamide Antibiotics) (Verified  Allergy, Unknown, stops 

breathing, 21)


     nitrofurantoin (Verified  Allergy, Unknown, 21)





Patient Home Medication List


Home Medication List Reviewed:  Yes


Cefdinir (Cefdinir) 300 Mg Capsule, 300 MG PO BID


   Prescribed by: ANNE-MARIE CHAVIRA on 21 011


Ciprofloxacin HCl (Ciprofloxacin HCl) 500 Mg Tablet, 500 MG PO BID


   Prescribed by: BRENT REICH on 2/10/22 0336


Ofloxacin (Ofloxacin) 5 Ml Drops, 10 DROPS EACH EAR BID


   Prescribed by: BRENT REICH on 2/10/22 0336


Ondansetron (Ondansetron Odt) 4 Mg Tab.rapdis, 4 MG PO Q6H PRN for NAUSEA

/VOMITING


   Prescribed by: BELKYS BAIG on 7/10/21 1714





Review of Systems


Review of Systems


Constitutional:  No chills, No fever


EENTM:  ear pain, nose congestion; No ear discharge, No throat pain


Respiratory:  no symptoms reported


Cardiovascular:  no symptoms reported


Gastrointestinal:  see HPI


Genitourinary:  no symptoms reported


Musculoskeletal:  no symptoms reported


Skin:  No rash


Psychiatric/Neurological:  See HPI, Anxiety





Past Medical-Social-Family Hx


Patient Social History


Substance use?:  Yes


Substance type:  Methamphetamine (Last use 2021)





Immunizations Up To Date


First/Initial COVID19 Vaccinat:  NA





Physical Exam


Vital Signs





Vital Signs - First Documented








 2/10/22





 00:30


 


Temp 36.4


 


Pulse 65


 


Resp 14


 


B/P (MAP) 144/93 (110)


 


Pulse Ox 98


 


O2 Delivery Room Air





Capillary Refill :


Height, Weight, BMI


Height: '"


Weight: lbs. oz. kg; 40.00 BMI


Method:


General Appearance:  Mild Distress, Obese


HEENT:  PERRL/EOMI, Pharynx Normal, TM Abnormal (L) (Scarring with some 

effusion), TM Abnormal (R) (Scarring with effusion and erythema with dullness to

the TM); No Tonsillar Exudate, No Tonsillar Enlargement


Neck:  Full Range of Motion, Normal Inspection, Non Tender, Supple


Respiratory:  Chest Non Tender, Lungs Clear, Normal Breath Sounds, No Accessory 

Muscle Use, No Respiratory Distress


Cardiovascular:  Regular Rate, Rhythm, Normal Peripheral Pulses


Gastrointestinal:  Normal Bowel Sounds, No Pulsatile Mass, Non Tender, Soft


Extremity:  Normal Capillary Refill, Normal Inspection, No Pedal Edema


Neurologic/Psychiatric:  Alert, Oriented x3, CNs II-XII Norm as Tested


Skin:  Normal Color, Warm/Dry





Progress/Results/Core Measures


Suspected Sepsis


SIRS


Temperature: 


Pulse:  


Respiratory Rate: 


 


Blood Pressure  / 


Mean:





Results/Orders


My Orders





Orders - BRENT REICH MD


Ketorolac Injection (Toradol Injection) (2/10/22 01:01)


Ondansetron  Oral Dissolve Tab (Zofran (2/10/22 01:01)


Ct Sinus Complete Wo (2/10/22 01:01)


Rx-Ofloxacin 0.3% Ophth Soln (Rx-Ocuflox (2/10/22 03:27)


Ciprofloxacin Tablet (Cipro Tablet) (2/10/22 03:27)





Vital Signs/I&O











 2/10/22 2/10/22





 00:30 04:00


 


Temp 36.4 


 


Pulse 65 72


 


Resp 14 12


 


B/P (MAP) 144/93 (110) 134/89


 


Pulse Ox 98 98


 


O2 Delivery Room Air Room Air





Capillary Refill :


Progress Note #1:  


Progress Note


Order Toradol shot for pain.  Give Zofran ODT to try and make sure stomach stay 

settled.  Obtain CT scan of the sinuses to evaluate for possible ear infection 

or sinus infection.


Progress Note #2:  


Progress Note


The CT scan showed signs of bilateral ear infection with fluid as well as fluid 

into the mastoids bilaterally.  This would be consistent with a chronic serous 

otitis media.  From review of online medical reference, up-to-date, 

recommendation is to start patient on quinolone antibiotic drops.  Well give her

a bottle of ofloxacin from here and a prescription to the pharmacy to ensure she

has enough to treat both ears.  Counseled on follow-up and return precautions.  

Advised to check back with ENT and/or PCP.  Try to sleep with head of bed 

elevated to help limit pressure on her ears.  She may continue with the nasal 

steroid spray.  Use naproxen and/or ibuprofen and/or acetaminophen for pain.  In

addition to the topical eardrops will also use antibiotics since she has a 

history of chronic recurrent ear infections and had signs of mastoiditis as 

well.  Patient reports that she usually responds well to Cipro so we will use 

oral Cipro for her antibiotic of choice.





Diagnostic Imaging





   Diagonstic Imaging:  CT


   Plain Films/CT/US/NM/MRI:  facial bones


Comments


                 [facility] [place], [place]





NAME:   ALEJANDRINA LEMUS


MED REC#:   [id number]


ACCOUNT#:   [id number]


PT STATUS:   DEP ER


:   1980


PHYSICIAN:   BRENT REICH MD


ADMIT DATE:   02/10/22/ER FS


                                  ***Signed***


Date of Exam:02/10/22





CT SINUS COMPLETE WO








PROCEDURE: CT sinuses without contrast





TECHNIQUE: Multiple contiguous axial images were obtained through


the sinuses without the use of intravenous contrast. Coronal and


sagittal reformations were then performed. Auto Exposure Controls


were utilized during the CT exam to meet ALARA standards for


radiation dose reduction. 





INDICATION: Bilateral ear pain x2 weeks





There is fluid in the mastoid air cells bilaterally. There are


some membrane thickening of the middle ear.


Sinuses are clear. Ethmoid air cells are clear. Frontal sinuses


clear. Sphenoid sinus is clear.





IMPRESSION: Membrane thickening of the middle ear and fluid


retention in the mastoid air cells. In the right clinical setting


this could be consistent with otitis media with mastoiditis or


mastoid effusions.





I agree with preliminary interpretation.





Dictated by: 





  Dictated on workstation # [id number]








Dict:   02/10/22 0554


Trans:   02/10/22 0557


[facility] [id number]





Interpreted by:     KRISTINE HERNANDEZ MD


Electronically signed by: KRISTINE HERNANDEZ MD 02/10/22 0557


   Reviewed:  Reviewed [place] Study, Reviewed by Me





Departure


Impression





   Primary Impression:  


   Otitis media


   Qualified Codes:  H65.23 - Chronic serous otitis media, bilateral


   Additional Impression:  


   Mastoiditis of both sides


Disposition:  01 HOME, SELF-CARE


Condition:  Stable





Departure-Patient Inst.


Decision time for Depature:  03:07


Referrals:  


KAT PRETTY (PCP)


Primary Care Physician








ELSY,LOCAL PHYSICIAN (Family)


Primary Care Physician








MARIO STANLEY MD


Patient Instructions:  Ear Infection ED, Mastoiditis (DC)





Add. Discharge Instructions:  


Take the full course of antibiotics to treat for ear infection and fluid in the 

mastoid sinuses. 


For the drops use 10 drops in each ear twice a day for 14 days and for the 

antibiotic pills use 500 mg twice a day for 10 days





Follow up with Dr. Stanley or clinic with your primary care provider about 

continued problems or if not improving


Scripts


Ciprofloxacin HCl (Ciprofloxacin HCl) 500 Mg Tablet


500 MG PO BID for Otitis media/Mastoiditis for 10 Days, #20 TAB 0 Refills


   Prov: BRENT REICH MD         2/10/22 


Ofloxacin (Ofloxacin) 5 Ml Drops


10 DROPS EACH EAR BID for Chronic Serous Otitis Media for 14 Days, #10 ML 1 

Refill


   Prov: BRENT REICH MD         2/10/22











BRENT REICH MD               Feb 10, 2022 00:44

## 2022-02-10 NOTE — DIAGNOSTIC IMAGING REPORT
PROCEDURE: CT sinuses without contrast



TECHNIQUE: Multiple contiguous axial images were obtained through

the sinuses without the use of intravenous contrast. Coronal and

sagittal reformations were then performed. Auto Exposure Controls

were utilized during the CT exam to meet ALARA standards for

radiation dose reduction. 



INDICATION: Bilateral ear pain x2 weeks



There is fluid in the mastoid air cells bilaterally. There are

some membrane thickening of the middle ear.

Sinuses are clear. Ethmoid air cells are clear. Frontal sinuses

clear. Sphenoid sinus is clear.



IMPRESSION: Membrane thickening of the middle ear and fluid

retention in the mastoid air cells. In the right clinical setting

this could be consistent with otitis media with mastoiditis or

mastoid effusions.



I agree with preliminary interpretation.



Dictated by: 



  Dictated on workstation # [id number]

## 2022-06-28 ENCOUNTER — HOSPITAL ENCOUNTER (OUTPATIENT)
Dept: HOSPITAL 75 - LAB FS | Age: 42
End: 2022-06-28
Attending: REGISTERED NURSE
Payer: MEDICAID

## 2022-06-28 ENCOUNTER — HOSPITAL ENCOUNTER (OUTPATIENT)
Dept: HOSPITAL 75 - LABNPT | Age: 42
End: 2022-06-28
Attending: FAMILY MEDICINE
Payer: MEDICAID

## 2022-06-28 DIAGNOSIS — Z01.419: ICD-10-CM

## 2022-06-28 DIAGNOSIS — E11.9: ICD-10-CM

## 2022-06-28 DIAGNOSIS — Z11.3: Primary | ICD-10-CM

## 2022-06-28 DIAGNOSIS — Z11.3: ICD-10-CM

## 2022-06-28 DIAGNOSIS — N90.7: ICD-10-CM

## 2022-06-28 DIAGNOSIS — Z12.39: ICD-10-CM

## 2022-06-28 DIAGNOSIS — Z01.419: Primary | ICD-10-CM

## 2022-06-28 PROCEDURE — 87491 CHLMYD TRACH DNA AMP PROBE: CPT

## 2022-06-28 PROCEDURE — 87340 HEPATITIS B SURFACE AG IA: CPT

## 2022-06-28 PROCEDURE — 87591 N.GONORRHOEAE DNA AMP PROB: CPT

## 2022-06-28 PROCEDURE — 86780 TREPONEMA PALLIDUM: CPT

## 2022-06-28 PROCEDURE — 36415 COLL VENOUS BLD VENIPUNCTURE: CPT

## 2022-06-28 PROCEDURE — 87210 SMEAR WET MOUNT SALINE/INK: CPT

## 2022-06-28 PROCEDURE — 87389 HIV-1 AG W/HIV-1&-2 AB AG IA: CPT

## 2022-06-28 PROCEDURE — 86803 HEPATITIS C AB TEST: CPT

## 2022-09-29 ENCOUNTER — HOSPITAL ENCOUNTER (OUTPATIENT)
Dept: HOSPITAL 75 - LAB FS | Age: 42
End: 2022-09-29
Attending: REGISTERED NURSE
Payer: MEDICAID

## 2022-09-29 DIAGNOSIS — E55.9: ICD-10-CM

## 2022-09-29 DIAGNOSIS — E11.9: Primary | ICD-10-CM

## 2022-09-29 DIAGNOSIS — E78.5: ICD-10-CM

## 2022-09-29 LAB
ALBUMIN SERPL-MCNC: 3.9 GM/DL (ref 3.2–4.5)
ALP SERPL-CCNC: 103 U/L (ref 40–136)
ALT SERPL-CCNC: 54 U/L (ref 0–55)
BILIRUB SERPL-MCNC: 0.4 MG/DL (ref 0.1–1)
BUN/CREAT SERPL: 22
CALCIUM SERPL-MCNC: 8.4 MG/DL (ref 8.5–10.1)
CHLORIDE SERPL-SCNC: 96 MMOL/L (ref 98–107)
CHOLEST SERPL-MCNC: 171 MG/DL (ref ?–200)
CO2 SERPL-SCNC: 23 MMOL/L (ref 21–32)
CREAT SERPL-MCNC: 0.54 MG/DL (ref 0.6–1.3)
GFR SERPLBLD BASED ON 1.73 SQ M-ARVRAT: 118 ML/MIN
GLUCOSE SERPL-MCNC: 341 MG/DL (ref 70–105)
HDLC SERPL-MCNC: 37 MG/DL (ref 40–60)
POTASSIUM SERPL-SCNC: 3.8 MMOL/L (ref 3.6–5)
PROT SERPL-MCNC: 7.1 GM/DL (ref 6.4–8.2)
SODIUM SERPL-SCNC: 133 MMOL/L (ref 135–145)
TRIGL SERPL-MCNC: 446 MG/DL (ref ?–150)
VLDLC SERPL CALC-MCNC: (no result) MG/DL (ref 5–40)

## 2022-09-29 PROCEDURE — 36415 COLL VENOUS BLD VENIPUNCTURE: CPT

## 2022-09-29 PROCEDURE — 80061 LIPID PANEL: CPT

## 2022-09-29 PROCEDURE — 83036 HEMOGLOBIN GLYCOSYLATED A1C: CPT

## 2022-09-29 PROCEDURE — 80053 COMPREHEN METABOLIC PANEL: CPT

## 2022-09-29 PROCEDURE — 82306 VITAMIN D 25 HYDROXY: CPT

## 2022-09-29 PROCEDURE — 82043 UR ALBUMIN QUANTITATIVE: CPT
